# Patient Record
Sex: FEMALE | Race: BLACK OR AFRICAN AMERICAN
[De-identification: names, ages, dates, MRNs, and addresses within clinical notes are randomized per-mention and may not be internally consistent; named-entity substitution may affect disease eponyms.]

---

## 2018-09-08 ENCOUNTER — HOSPITAL ENCOUNTER (EMERGENCY)
Dept: HOSPITAL 72 - EMR | Age: 48
Discharge: HOME | End: 2018-09-08
Payer: MEDICAID

## 2018-09-08 VITALS — WEIGHT: 130 LBS | HEIGHT: 66 IN | BODY MASS INDEX: 20.89 KG/M2

## 2018-09-08 VITALS — SYSTOLIC BLOOD PRESSURE: 139 MMHG | DIASTOLIC BLOOD PRESSURE: 87 MMHG

## 2018-09-08 VITALS — DIASTOLIC BLOOD PRESSURE: 87 MMHG | SYSTOLIC BLOOD PRESSURE: 139 MMHG

## 2018-09-08 DIAGNOSIS — M54.32: ICD-10-CM

## 2018-09-08 DIAGNOSIS — Z76.0: Primary | ICD-10-CM

## 2018-09-08 DIAGNOSIS — M54.31: ICD-10-CM

## 2018-09-08 PROCEDURE — 99283 EMERGENCY DEPT VISIT LOW MDM: CPT

## 2018-09-08 PROCEDURE — 96372 THER/PROPH/DIAG INJ SC/IM: CPT

## 2018-09-08 NOTE — EMERGENCY ROOM REPORT
History of Present Illness


General


Chief Complaint:  General Complaint


Source:  Patient





Present Illness


HPI


47-year-old female presents to the emergency department complaining of 10 out 

of 10 in severity pain across the low back with bilateral sciatica exacerbation 

of her chronic symptoms after running out of her pain medication today.  

Patient reports that she regularly takes 10 mg Norco several times a day after 

having back and neck fusions performed.  Patient denies new trauma or fall 

denies recent spinal procedures, fever/chills or history of neoplastic disease.

Denies numbness tingling or loss of sensation or gross motor movements of the 

extremities, incontinence of bowel or bladder, or urinary retention.


Allergies:  


Coded Allergies:  


     No Known Allergies (Unverified , 9/8/18)





Patient History


Past Medical History:  see triage record


Past Surgical History:  none


Pertinent Family History:  none


Last Menstrual Period:  8 years ago


Reviewed Nursing Documentation:  PMH: Agreed; PSxH: Agreed





Nursing Documentation-PMH


Past Medical History:  No History, Except For





Review of Systems


All Other Systems:  negative except mentioned in HPI





Physical Exam





Vital Signs








  Date Time  Temp Pulse Resp B/P (MAP) Pulse Ox O2 Delivery O2 Flow Rate FiO2


 


9/8/18 14:47 97.9 72 15 138/85 99 Room Air  





 97.9       








Sp02 EP Interpretation:  reviewed, normal


General Appearance:  alert, GCS 15, non-toxic, mild distress


Head:  normocephalic, atraumatic


Eyes:  bilateral eye normal inspection, bilateral eye PERRL


ENT:  hearing grossly normal, normal voice


Neck:  full range of motion


Respiratory:  lungs clear, normal breath sounds, speaking full sentences


Cardiovascular #1:  regular rate, rhythm


Musculoskeletal:  back normal, gait/station normal, normal range of motion, 

tender


Neurologic:  alert, oriented x3, responsive, motor strength/tone normal, 

sensory intact, normal gait, speech normal, grossly normal


Psychiatric:  judgement/insight normal, anxious


Skin:  normal color, no rash, warm/dry, well hydrated





Medical Decision Making


PA Attestation


Dr. De León  is my supervising Physician whom patient management has been 

discussed with.


Diagnostic Impression:  


 Primary Impression:  


 Chronic pain


 Qualified Codes:  G89.4 - Chronic pain syndrome


 Additional Impression:  


 Encounter for medication refill


ER Course


47-year-old female presents to the emergency department complaining of 10 out 

of 10 in severity pain across the low back with bilateral sciatica exacerbation 

of her chronic symptoms after running out of her pain medication today.  

Patient reports that she regularly takes 10 mg Norco several times a day after 

having back and neck fusions performed.  Patient denies new trauma or fall 

denies recent spinal procedures, fever/chills or history of neoplastic disease.

Denies numbness tingling or loss of sensation or gross motor movements of the 

extremities, incontinence of bowel or bladder, or urinary retention.





Ddx considered but are not limited to: drug seeking, OD, chronic pain management

, MSK injury/fall, pain exacerbation just to name a few. 





Vital signs: are WNL, pt. is afebrile


H&PE are most consistent with request for medication refill. 





I reviewed this patient's cures report and although patient states she takes 10 

mg Norco it appears that in the last 6 months she is regularly prescribed 7.5 

mg.  Patient received last prescription in August. 





ORDERS: none required at this time, the diagnosis is clinical





ED INTERVENTIONS: 


--Norco PO


-Lidoderm TP


-Toradol IM








I discussed with this patient are ER department pain medicine prescribing 

policy.  Discussed with patient that she needs to contact her primary 

prescribing provider.  In the meantime she will be provided with prescriptions 

for her symptoms that are not controlled. 





DISCHARGE: At this time pt. is stable for d/c to home. Will provide printed 

patient care instructions, and any necessary prescriptions. Care plan and 

follow up instructions have been discussed with the patient prior to discharge.





Last Vital Signs








  Date Time  Temp Pulse Resp B/P (MAP) Pulse Ox O2 Delivery O2 Flow Rate FiO2


 


9/8/18 15:17 97.9       


 


9/8/18 14:47  72 15 138/85 99 Room Air  








Disposition:  HOME, SELF-CARE


Condition:  Stable


Scripts


Methocarbamol* (ROBAXIN*) 500 Mg Tablet


1000 MG PO TID, #42 TAB 0 Refills


   Prov: Hellen Sheppard         9/8/18


Referrals:  


NON PHYSICIAN (PCP)


Patient Instructions:  Medical Screening Exam





Additional Instructions:  


Take any previously prescribed medications as directed. 





 ** Follow up with your primary prescribing  PCP or Pain management physician 

in 3-5 days, even if your symptoms have resolved. please let them know you had 

to visit the ED due to uncontrolled pain. ** 





Return sooner to ED if new symptoms occur, or current symptoms become worse. 











- Please note that this Emergency Department Report was dictated using [employer] technology software, occasionally this can lead to 

erroneous entry secondary to interpretation by the dictation equipment.











Hellen Sheppard Sep 8, 2018 15:37